# Patient Record
Sex: MALE | Race: WHITE | HISPANIC OR LATINO | Employment: FULL TIME | URBAN - METROPOLITAN AREA
[De-identification: names, ages, dates, MRNs, and addresses within clinical notes are randomized per-mention and may not be internally consistent; named-entity substitution may affect disease eponyms.]

---

## 2017-12-11 ENCOUNTER — CLINICAL SUPPORT (OUTPATIENT)
Dept: FAMILY MEDICINE | Facility: CLINIC | Age: 47
End: 2017-12-11

## 2017-12-11 DIAGNOSIS — Z00.00 ROUTINE GENERAL MEDICAL EXAMINATION AT A HEALTH CARE FACILITY: Primary | ICD-10-CM

## 2017-12-11 PROCEDURE — 80305 DRUG TEST PRSMV DIR OPT OBS: CPT | Mod: S$GLB,,, | Performed by: FAMILY MEDICINE

## 2017-12-11 NOTE — PROGRESS NOTES
Rosanna has presented today for self pay urine drug screen. He  has completed Non-DOT Drug Screen .    Total $35    Adela Patel

## 2018-01-03 ENCOUNTER — CLINICAL SUPPORT (OUTPATIENT)
Dept: FAMILY MEDICINE | Facility: CLINIC | Age: 48
End: 2018-01-03

## 2018-01-03 DIAGNOSIS — Z00.00 ROUTINE GENERAL MEDICAL EXAMINATION AT A HEALTH CARE FACILITY: ICD-10-CM

## 2018-01-03 PROCEDURE — 80305 DRUG TEST PRSMV DIR OPT OBS: CPT | Mod: S$GLB,,, | Performed by: FAMILY MEDICINE

## 2018-01-04 NOTE — PROGRESS NOTES
Pt here for a self pay urine drug screen.   Total charge is 35.00    We need to fax results to number provided and call pt to  a copy.

## 2020-06-04 ENCOUNTER — TELEPHONE (OUTPATIENT)
Dept: ORTHOPEDICS | Facility: CLINIC | Age: 50
End: 2020-06-04

## 2020-06-04 NOTE — TELEPHONE ENCOUNTER
Left message on voicemail for pt to call back. He was scheduled to see THI Spencer,NPC for biceps pain. Ms. Pop does not treat that condition. Pt will need to be rescheduled to Dr. Petersen or Dr. Bruner. Thanks, Brenda

## 2020-06-10 ENCOUNTER — OFFICE VISIT (OUTPATIENT)
Dept: ORTHOPEDICS | Facility: CLINIC | Age: 50
End: 2020-06-10
Payer: COMMERCIAL

## 2020-06-10 VITALS — TEMPERATURE: 100 F | WEIGHT: 147 LBS | RESPIRATION RATE: 16 BRPM | BODY MASS INDEX: 22.28 KG/M2 | HEIGHT: 68 IN

## 2020-06-10 DIAGNOSIS — M72.2 PLANTAR FASCIITIS, LEFT: ICD-10-CM

## 2020-06-10 DIAGNOSIS — M75.20 BICEPS TENDINITIS, UNSPECIFIED LATERALITY: Primary | ICD-10-CM

## 2020-06-10 PROCEDURE — 99204 PR OFFICE/OUTPT VISIT, NEW, LEVL IV, 45-59 MIN: ICD-10-PCS | Mod: S$GLB,,, | Performed by: ORTHOPAEDIC SURGERY

## 2020-06-10 PROCEDURE — 99204 OFFICE O/P NEW MOD 45 MIN: CPT | Mod: S$GLB,,, | Performed by: ORTHOPAEDIC SURGERY

## 2020-06-10 PROCEDURE — 99999 PR PBB SHADOW E&M-EST. PATIENT-LVL III: ICD-10-PCS | Mod: PBBFAC,,, | Performed by: ORTHOPAEDIC SURGERY

## 2020-06-10 PROCEDURE — 99999 PR PBB SHADOW E&M-EST. PATIENT-LVL III: CPT | Mod: PBBFAC,,, | Performed by: ORTHOPAEDIC SURGERY

## 2020-06-10 RX ORDER — MELOXICAM 15 MG/1
15 TABLET ORAL DAILY
Qty: 30 TABLET | Refills: 2 | Status: SHIPPED | OUTPATIENT
Start: 2020-06-10 | End: 2020-07-10
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
## 2020-09-02 ENCOUNTER — OFFICE VISIT (OUTPATIENT)
Dept: ORTHOPEDICS | Facility: CLINIC | Age: 50
End: 2020-09-02
Payer: COMMERCIAL

## 2020-09-02 VITALS — BODY MASS INDEX: 22.28 KG/M2 | HEIGHT: 68 IN | WEIGHT: 147 LBS | RESPIRATION RATE: 16 BRPM

## 2020-09-02 DIAGNOSIS — M35.3 POLYMYALGIA: Primary | ICD-10-CM

## 2020-09-02 DIAGNOSIS — M79.10 MUSCLE PAIN: Primary | ICD-10-CM

## 2020-09-02 PROCEDURE — 99999 PR PBB SHADOW E&M-EST. PATIENT-LVL III: ICD-10-PCS | Mod: PBBFAC,,, | Performed by: ORTHOPAEDIC SURGERY

## 2020-09-02 PROCEDURE — 99213 PR OFFICE/OUTPT VISIT, EST, LEVL III, 20-29 MIN: ICD-10-PCS | Mod: S$GLB,,, | Performed by: ORTHOPAEDIC SURGERY

## 2020-09-02 PROCEDURE — 99213 OFFICE O/P EST LOW 20 MIN: CPT | Mod: S$GLB,,, | Performed by: ORTHOPAEDIC SURGERY

## 2020-09-02 PROCEDURE — 99999 PR PBB SHADOW E&M-EST. PATIENT-LVL III: CPT | Mod: PBBFAC,,, | Performed by: ORTHOPAEDIC SURGERY
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
## 2020-09-03 ENCOUNTER — LAB VISIT (OUTPATIENT)
Dept: LAB | Facility: HOSPITAL | Age: 50
End: 2020-09-03
Attending: ORTHOPAEDIC SURGERY
Payer: COMMERCIAL

## 2020-09-03 DIAGNOSIS — M79.10 MUSCLE PAIN: ICD-10-CM

## 2020-09-03 LAB
CCP AB SER IA-ACNC: <0.5 U/ML
CK SERPL-CCNC: 188 U/L (ref 20–200)
CRP SERPL-MCNC: 0.3 MG/L (ref 0–8.2)
CRP SERPL-MCNC: 0.3 MG/L (ref 0–8.2)
ERYTHROCYTE [SEDIMENTATION RATE] IN BLOOD BY WESTERGREN METHOD: 1 MM/HR (ref 0–10)
ERYTHROCYTE [SEDIMENTATION RATE] IN BLOOD BY WESTERGREN METHOD: 1 MM/HR (ref 0–10)

## 2020-09-03 PROCEDURE — 36415 COLL VENOUS BLD VENIPUNCTURE: CPT | Mod: PO

## 2020-09-03 PROCEDURE — 85651 RBC SED RATE NONAUTOMATED: CPT | Mod: PO

## 2020-09-03 PROCEDURE — 86431 RHEUMATOID FACTOR QUANT: CPT

## 2020-09-03 PROCEDURE — 86038 ANTINUCLEAR ANTIBODIES: CPT

## 2020-09-03 PROCEDURE — 85025 COMPLETE CBC W/AUTO DIFF WBC: CPT

## 2020-09-03 PROCEDURE — 86140 C-REACTIVE PROTEIN: CPT

## 2020-09-03 PROCEDURE — 81374 HLA I TYPING 1 ANTIGEN LR: CPT | Mod: PO

## 2020-09-03 PROCEDURE — 86200 CCP ANTIBODY: CPT

## 2020-09-03 PROCEDURE — 82550 ASSAY OF CK (CPK): CPT

## 2020-09-04 LAB
BASOPHILS # BLD AUTO: 0.02 K/UL (ref 0–0.2)
BASOPHILS NFR BLD: 0.5 % (ref 0–1.9)
DIFFERENTIAL METHOD: NORMAL
EOSINOPHIL # BLD AUTO: 0.1 K/UL (ref 0–0.5)
EOSINOPHIL NFR BLD: 2.6 % (ref 0–8)
ERYTHROCYTE [DISTWIDTH] IN BLOOD BY AUTOMATED COUNT: 12.7 % (ref 11.5–14.5)
HCT VFR BLD AUTO: 45.1 % (ref 40–54)
HGB BLD-MCNC: 14.7 G/DL (ref 14–18)
IMM GRANULOCYTES # BLD AUTO: 0.01 K/UL (ref 0–0.04)
IMM GRANULOCYTES NFR BLD AUTO: 0.2 % (ref 0–0.5)
LYMPHOCYTES # BLD AUTO: 1.2 K/UL (ref 1–4.8)
LYMPHOCYTES NFR BLD: 27.2 % (ref 18–48)
MCH RBC QN AUTO: 29.5 PG (ref 27–31)
MCHC RBC AUTO-ENTMCNC: 32.6 G/DL (ref 32–36)
MCV RBC AUTO: 90 FL (ref 82–98)
MONOCYTES # BLD AUTO: 0.3 K/UL (ref 0.3–1)
MONOCYTES NFR BLD: 6.8 % (ref 4–15)
NEUTROPHILS # BLD AUTO: 2.7 K/UL (ref 1.8–7.7)
NEUTROPHILS NFR BLD: 62.7 % (ref 38–73)
NRBC BLD-RTO: 0 /100 WBC
PLATELET # BLD AUTO: 199 K/UL (ref 150–350)
PMV BLD AUTO: 9.9 FL (ref 9.2–12.9)
RBC # BLD AUTO: 4.99 M/UL (ref 4.6–6.2)
WBC # BLD AUTO: 4.27 K/UL (ref 3.9–12.7)

## 2020-09-05 LAB — RHEUMATOID FACT SERPL-ACNC: 11 IU/ML (ref 0–15)

## 2020-09-08 LAB — ANA SER QL IF: NORMAL

## 2020-09-14 LAB
HLA B27 INTERPRETATION: NORMAL
HLA-B27 RELATED AG QL: NEGATIVE
HLA-B27 RELATED AG QL: NORMAL

## 2020-09-21 ENCOUNTER — TELEPHONE (OUTPATIENT)
Dept: ORTHOPEDICS | Facility: CLINIC | Age: 50
End: 2020-09-21

## 2020-09-21 DIAGNOSIS — M25.50 POLYARTHRALGIA: Primary | ICD-10-CM

## 2020-09-21 NOTE — TELEPHONE ENCOUNTER
----- Message from Tiff Sousa MA sent at 9/21/2020 12:51 PM CDT -----  Contact: pt  Lab results   Call back

## 2020-09-21 NOTE — TELEPHONE ENCOUNTER
Called and spoke with patient and advised that all of his labs were normal per Dr. Esteban. Appointment made with Dr. De Paz per Dr. Esteban.

## 2020-09-23 ENCOUNTER — OFFICE VISIT (OUTPATIENT)
Dept: PHYSICAL MEDICINE AND REHAB | Facility: CLINIC | Age: 50
End: 2020-09-23
Payer: COMMERCIAL

## 2020-09-23 VITALS — WEIGHT: 134.56 LBS | HEIGHT: 68 IN | BODY MASS INDEX: 20.39 KG/M2

## 2020-09-23 DIAGNOSIS — M79.602 PAIN IN BOTH UPPER EXTREMITIES: ICD-10-CM

## 2020-09-23 DIAGNOSIS — M79.601 PAIN IN BOTH UPPER EXTREMITIES: ICD-10-CM

## 2020-09-23 PROCEDURE — 99243 PR OFFICE CONSULTATION,LEVEL III: ICD-10-PCS | Mod: S$GLB,,, | Performed by: PHYSICAL MEDICINE & REHABILITATION

## 2020-09-23 PROCEDURE — 99243 OFF/OP CNSLTJ NEW/EST LOW 30: CPT | Mod: S$GLB,,, | Performed by: PHYSICAL MEDICINE & REHABILITATION

## 2020-09-23 PROCEDURE — 99999 PR PBB SHADOW E&M-EST. PATIENT-LVL III: ICD-10-PCS | Mod: PBBFAC,,, | Performed by: PHYSICAL MEDICINE & REHABILITATION

## 2020-09-23 PROCEDURE — 99999 PR PBB SHADOW E&M-EST. PATIENT-LVL III: CPT | Mod: PBBFAC,,, | Performed by: PHYSICAL MEDICINE & REHABILITATION

## 2020-09-23 RX ORDER — METHYLPREDNISOLONE 4 MG/1
TABLET ORAL
Qty: 1 PACKAGE | Refills: 0 | Status: SHIPPED | OUTPATIENT
Start: 2020-09-23 | End: 2020-10-14

## 2020-09-23 RX ORDER — MELOXICAM 15 MG/1
TABLET ORAL
COMMUNITY
Start: 2020-08-19
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
## 2020-09-23 NOTE — LETTER
September 23, 2020      Bonifacio Esteban MD  [address] [place] [address] [address]  [place] [address]           [facility]  [address] [facility] [address]  Phone: [phone]  Fax: [phone]          Patient: Rosanna Fish   MR Number: [id number]   YOB: 1970   Date of Visit: 9/23/2020       Dear Dr. Bonifacio Esteban:    Thank you for referring Rosanna Fish to me for evaluation. Attached you will find relevant portions of my assessment and plan of care.    If you have questions, please do not hesitate to call me. I look forward to following Rosanna Fish along with you.    Sincerely,    Anand De Paz MD    Enclosure  CC:  No Recipients    If you would like to receive this communication electronically, please contact [email or web address] or [phone] to request more information on [employer] access.    For providers and/or their staff who would like to refer a patient to [facility], please contact us through our one-stop-shop provider referral line, [facility], at [phone].    If you feel you have received this communication in error or would no longer like to receive these types of communications, please e-mail [email or web address]

## 2020-09-30 ENCOUNTER — CLINICAL SUPPORT (OUTPATIENT)
Dept: REHABILITATION | Facility: HOSPITAL | Age: 50
End: 2020-09-30
Attending: PHYSICAL MEDICINE & REHABILITATION
Payer: COMMERCIAL

## 2020-09-30 DIAGNOSIS — M79.602 PAIN IN BOTH UPPER EXTREMITIES: ICD-10-CM

## 2020-09-30 DIAGNOSIS — M79.601 PAIN IN BOTH UPPER EXTREMITIES: ICD-10-CM

## 2020-09-30 PROCEDURE — 97161 PT EVAL LOW COMPLEX 20 MIN: CPT | Mod: PO

## 2020-09-30 PROCEDURE — 97140 MANUAL THERAPY 1/> REGIONS: CPT | Mod: PO

## 2020-09-30 PROCEDURE — 97110 THERAPEUTIC EXERCISES: CPT | Mod: PO
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
## 2021-01-08 ENCOUNTER — CLINICAL SUPPORT (OUTPATIENT)
Dept: REHABILITATION | Facility: HOSPITAL | Age: 51
End: 2021-01-08
Attending: PHYSICAL MEDICINE & REHABILITATION
Payer: COMMERCIAL

## 2021-01-08 DIAGNOSIS — M79.602 PAIN IN BOTH UPPER EXTREMITIES: Primary | ICD-10-CM

## 2021-01-08 DIAGNOSIS — M79.601 PAIN IN BOTH UPPER EXTREMITIES: Primary | ICD-10-CM

## 2021-01-08 PROCEDURE — 97140 MANUAL THERAPY 1/> REGIONS: CPT | Mod: PO

## 2021-01-11 ENCOUNTER — CLINICAL SUPPORT (OUTPATIENT)
Dept: REHABILITATION | Facility: HOSPITAL | Age: 51
End: 2021-01-11
Payer: COMMERCIAL

## 2021-01-11 DIAGNOSIS — M79.602 PAIN IN BOTH UPPER EXTREMITIES: Primary | ICD-10-CM

## 2021-01-11 DIAGNOSIS — M79.601 PAIN IN BOTH UPPER EXTREMITIES: Primary | ICD-10-CM

## 2021-01-11 PROCEDURE — 97140 MANUAL THERAPY 1/> REGIONS: CPT | Mod: PN

## 2023-12-21 ENCOUNTER — OFFICE VISIT (OUTPATIENT)
Dept: UROLOGY | Facility: CLINIC | Age: 53
End: 2023-12-21
Payer: COMMERCIAL

## 2023-12-21 VITALS — HEIGHT: 68 IN | WEIGHT: 146.63 LBS | BODY MASS INDEX: 22.22 KG/M2

## 2023-12-21 DIAGNOSIS — R30.9 PAINFUL URINATION: Primary | ICD-10-CM

## 2023-12-21 DIAGNOSIS — N41.0 ACUTE PROSTATITIS: ICD-10-CM

## 2023-12-21 LAB
BACTERIA #/AREA URNS HPF: NORMAL /HPF
BILIRUBIN, UA POC OHS: NEGATIVE
BLOOD, UA POC OHS: NEGATIVE
CLARITY, UA POC OHS: CLEAR
COLOR, UA POC OHS: YELLOW
GLUCOSE, UA POC OHS: NEGATIVE
KETONES, UA POC OHS: NEGATIVE
LEUKOCYTES, UA POC OHS: NEGATIVE
MICROSCOPIC COMMENT: NORMAL
NITRITE, UA POC OHS: NEGATIVE
PH, UA POC OHS: 7.5
PROTEIN, UA POC OHS: NEGATIVE
RBC #/AREA URNS HPF: 1 /HPF (ref 0–4)
SPECIFIC GRAVITY, UA POC OHS: 1.01
SQUAMOUS #/AREA URNS HPF: 1 /HPF
UROBILINOGEN, UA POC OHS: 0.2
WBC #/AREA URNS HPF: 1 /HPF (ref 0–5)

## 2023-12-21 PROCEDURE — 81000 URINALYSIS NONAUTO W/SCOPE: CPT | Mod: PO

## 2023-12-21 PROCEDURE — 99204 PR OFFICE/OUTPT VISIT, NEW, LEVL IV, 45-59 MIN: ICD-10-PCS | Mod: 25,S$GLB,,

## 2023-12-21 PROCEDURE — 81003 URINALYSIS AUTO W/O SCOPE: CPT | Mod: QW,S$GLB,,

## 2023-12-21 PROCEDURE — 99999 PR PBB SHADOW E&M-EST. PATIENT-LVL III: ICD-10-PCS | Mod: PBBFAC,,,

## 2023-12-21 PROCEDURE — 87086 URINE CULTURE/COLONY COUNT: CPT

## 2023-12-21 PROCEDURE — 99204 OFFICE O/P NEW MOD 45 MIN: CPT | Mod: 25,S$GLB,,

## 2023-12-21 PROCEDURE — 81003 POCT URINALYSIS(INSTRUMENT): ICD-10-PCS | Mod: QW,S$GLB,,

## 2023-12-21 PROCEDURE — 99999 PR PBB SHADOW E&M-EST. PATIENT-LVL III: CPT | Mod: PBBFAC,,,

## 2023-12-21 RX ORDER — LEVOFLOXACIN 500 MG/1
500 TABLET, FILM COATED ORAL DAILY
Qty: 21 TABLET | Refills: 0 | Status: SHIPPED | OUTPATIENT
Start: 2023-12-21 | End: 2024-01-11

## 2023-12-21 RX ORDER — ALFUZOSIN HYDROCHLORIDE 10 MG/1
10 TABLET, EXTENDED RELEASE ORAL
Qty: 21 TABLET | Refills: 0 | Status: SHIPPED | OUTPATIENT
Start: 2023-12-21 | End: 2024-02-14 | Stop reason: SDUPTHER
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
## 2023-12-23 LAB — BACTERIA UR CULT: NO GROWTH

## 2023-12-29 ENCOUNTER — PATIENT MESSAGE (OUTPATIENT)
Dept: UROLOGY | Facility: CLINIC | Age: 53
End: 2023-12-29
Payer: COMMERCIAL

## 2024-01-10 ENCOUNTER — PATIENT MESSAGE (OUTPATIENT)
Dept: UROLOGY | Facility: CLINIC | Age: 54
End: 2024-01-10
Payer: COMMERCIAL

## 2024-01-10 NOTE — TELEPHONE ENCOUNTER
The urine culture showed no growth. I recommend taking the scheduled Mobic. If symptoms worsen then we can repeat a urinalysis and culture.

## 2024-01-12 ENCOUNTER — TELEPHONE (OUTPATIENT)
Dept: UROLOGY | Facility: CLINIC | Age: 54
End: 2024-01-12
Payer: COMMERCIAL

## 2024-01-12 NOTE — TELEPHONE ENCOUNTER
----- Message from Lin Morales NP sent at 1/12/2024  4:11 PM CST -----  Contact: self  Per Dr. Maria- he needs to continue mobic as prescribed and alfuzosin- no abx needed at this time  ----- Message -----  From: Michelle Berry RN  Sent: 1/11/2024   3:06 PM CST  To: Lin Morales NP    He states that he had a bad reaction to the abx sent in but is still having the symptoms for prostate infection- he is requesting alternative  ----- Message -----  From: Claudia Azul  Sent: 1/11/2024  11:32 AM CST  To: Danita CERVANTES Staff    Type: Needs Medical Advice  Who Called: Patient   Best Call Back Number: [phone]  Pharmacy . :   [id number] [facility] [id number] [place] [address] AT [place] [address]  [id number] [place] [address]  Phone: [phone] Fax: [phone]  Additional Information: Pt needs a different  antibiotic for his prostate infection the other one has  side effects. Thanks

## 2024-02-14 ENCOUNTER — TELEPHONE (OUTPATIENT)
Dept: UROLOGY | Facility: CLINIC | Age: 54
End: 2024-02-14
Payer: COMMERCIAL

## 2024-02-14 DIAGNOSIS — R30.9 PAINFUL URINATION: ICD-10-CM

## 2024-02-14 DIAGNOSIS — N41.0 ACUTE PROSTATITIS: ICD-10-CM

## 2024-02-14 RX ORDER — DOXYCYCLINE 100 MG/1
100 CAPSULE ORAL 2 TIMES DAILY
Qty: 42 CAPSULE | Refills: 0 | Status: SHIPPED | OUTPATIENT
Start: 2024-02-14 | End: 2024-03-06

## 2024-02-14 RX ORDER — ALFUZOSIN HYDROCHLORIDE 10 MG/1
10 TABLET, EXTENDED RELEASE ORAL
Qty: 21 TABLET | Refills: 0 | Status: SHIPPED | OUTPATIENT
Start: 2024-02-14 | End: 2024-04-01

## 2024-02-14 NOTE — TELEPHONE ENCOUNTER
"----- Message from Michelle Berry RN sent at 2/14/2024 12:46 PM CST -----  Regarding: FW: still having pain  Patient never finished mobic prescribed because he states "it was causing joint pain" he says he is still having the prostate pain and requesting a different abx  ----- Message -----  From: Jovanny Be  Sent: 2/14/2024  12:20 PM CST  To: Danita CERVANTES Staff  Subject: still having pain                                Type:  Needs Medical Advice    Who Called: Pt    Symptoms (please be specific): still having pain     How long has patient had these symptoms:      Pharmacy name and phone #:        [facility] #[id number] - [place], [place] - [address] [place] [address] AT [place] [address] & [place] [address]  [id number] [place] [address]  Phone: [phone] Fax: [phone]        Would the patient rather a call back or a response via [facility]? Call back    Best Call Back Number: [phone]      Additional Information: Sts he wants to know about getting more antibiotics because he is still having pain.   Please advise -- Thank you         "

## 2024-02-14 NOTE — TELEPHONE ENCOUNTER
----- Message from Jovanny Be sent at 2/14/2024 12:19 PM CST -----  Regarding: still having pain  Type:  Needs Medical Advice    Who Called: Pt    Symptoms (please be specific): still having pain     How long has patient had these symptoms:      Pharmacy name and phone #:        [facility] #[id number] - [facility] [address] [address] AT [facility] [address] [place] [address] [address]  [address] [place] [address]  Phone: [phone] Fax: [phone]        Would the patient rather a call back or a response via [facility]? Call back    Best Call Back Number: [phone]      Additional Information: Sts he wants to know about getting more antibiotics because he is still having pain.   Please advise -- Thank you

## 2024-02-14 NOTE — TELEPHONE ENCOUNTER
"----- Message from Lin Morales NP sent at 2/14/2024  4:05 PM CST -----  Regarding: RE: still having pain  Doxycycline and alfuzosin sent in but if symptoms return he will need appt  with one of the physicians as it would not be an infection  ----- Message -----  From: Michelle Berry RN  Sent: 2/14/2024  12:48 PM CST  To: Lin Morales NP  Subject: FW: still having pain                            Patient never finished mobic prescribed because he states "it was causing joint pain" he says he is still having the prostate pain and requesting a different abx  ----- Message -----  From: Jovanny Be  Sent: 2/14/2024  12:20 PM CST  To: Danita CERVANTES Staff  Subject: still having pain                                Type:  Needs Medical Advice    Who Called: Pt    Symptoms (please be specific): still having pain     How long has patient had these symptoms:      Pharmacy name and phone #:        [facility] #[id number] - [facility] [id number] [place] [address] [address] [place] [address] & [place] [address]  [address] [place] [address]  Phone: [phone] Fax: [phone]        Would the patient rather a call back or a response via [facility]? Call back    Best Call Back Number: [phone]      Additional Information: Sts he wants to know about getting more antibiotics because he is still having pain.   Please advise -- Thank you           "

## 2024-04-01 ENCOUNTER — OFFICE VISIT (OUTPATIENT)
Dept: UROLOGY | Facility: CLINIC | Age: 54
End: 2024-04-01
Payer: COMMERCIAL

## 2024-04-01 VITALS — WEIGHT: 148.38 LBS | BODY MASS INDEX: 22.49 KG/M2 | HEIGHT: 68 IN

## 2024-04-01 DIAGNOSIS — R30.0 DYSURIA: Primary | ICD-10-CM

## 2024-04-01 DIAGNOSIS — R31.0 GROSS HEMATURIA: ICD-10-CM

## 2024-04-01 LAB
BILIRUBIN, UA POC OHS: NEGATIVE
BLOOD, UA POC OHS: NEGATIVE
CLARITY, UA POC OHS: CLEAR
COLOR, UA POC OHS: YELLOW
GLUCOSE, UA POC OHS: NEGATIVE
KETONES, UA POC OHS: NEGATIVE
LEUKOCYTES, UA POC OHS: NEGATIVE
NITRITE, UA POC OHS: NEGATIVE
PH, UA POC OHS: 6.5
PROTEIN, UA POC OHS: NEGATIVE
SPECIFIC GRAVITY, UA POC OHS: 1.01
UROBILINOGEN, UA POC OHS: 0.2

## 2024-04-01 PROCEDURE — 1159F MED LIST DOCD IN RCRD: CPT | Mod: CPTII,S$GLB,,

## 2024-04-01 PROCEDURE — 81003 URINALYSIS AUTO W/O SCOPE: CPT | Mod: QW,S$GLB,,

## 2024-04-01 PROCEDURE — 99999 PR PBB SHADOW E&M-EST. PATIENT-LVL III: CPT | Mod: PBBFAC,,,

## 2024-04-01 PROCEDURE — 99214 OFFICE O/P EST MOD 30 MIN: CPT | Mod: S$GLB,,,

## 2024-04-01 PROCEDURE — 3008F BODY MASS INDEX DOCD: CPT | Mod: CPTII,S$GLB,,

## 2024-04-01 PROCEDURE — 1160F RVW MEDS BY RX/DR IN RCRD: CPT | Mod: CPTII,S$GLB,,

## 2024-04-01 RX ORDER — PHENAZOPYRIDINE HYDROCHLORIDE 200 MG/1
200 TABLET, FILM COATED ORAL 3 TIMES DAILY PRN
Qty: 30 TABLET | Refills: 0 | Status: SHIPPED | OUTPATIENT
Start: 2024-04-01 | End: 2024-04-11

## 2024-04-01 RX ORDER — DOXYCYCLINE 100 MG/1
100 CAPSULE ORAL 2 TIMES DAILY
COMMUNITY
Start: 2024-03-29
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
## 2024-04-06 ENCOUNTER — HOSPITAL ENCOUNTER (OUTPATIENT)
Dept: RADIOLOGY | Facility: HOSPITAL | Age: 54
Discharge: HOME | End: 2024-04-06
Payer: COMMERCIAL

## 2024-04-06 DIAGNOSIS — R31.0 GROSS HEMATURIA: ICD-10-CM

## 2024-04-06 DIAGNOSIS — R30.0 DYSURIA: ICD-10-CM

## 2024-04-06 PROCEDURE — 74176 CT ABD & PELVIS W/O CONTRAST: CPT | Mod: TC,PO

## 2024-04-06 PROCEDURE — 74176 CT ABD & PELVIS W/O CONTRAST: CPT | Mod: 26,,, | Performed by: RADIOLOGY

## 2024-04-12 ENCOUNTER — TELEPHONE (OUTPATIENT)
Dept: UROLOGY | Facility: CLINIC | Age: 54
End: 2024-04-12
Payer: COMMERCIAL

## 2024-04-12 DIAGNOSIS — N40.1 BENIGN PROSTATIC HYPERPLASIA WITH WEAK URINARY STREAM: Primary | ICD-10-CM

## 2024-04-12 DIAGNOSIS — R39.12 BENIGN PROSTATIC HYPERPLASIA WITH WEAK URINARY STREAM: Primary | ICD-10-CM

## 2024-04-12 RX ORDER — MELOXICAM 15 MG/1
15 TABLET ORAL DAILY
Qty: 14 TABLET | Refills: 0 | Status: SHIPPED | OUTPATIENT
Start: 2024-04-12 | End: 2024-04-26

## 2024-04-12 RX ORDER — ALFUZOSIN HYDROCHLORIDE 10 MG/1
10 TABLET, EXTENDED RELEASE ORAL
Qty: 30 TABLET | Refills: 0 | Status: SHIPPED | OUTPATIENT
Start: 2024-04-12 | End: 2024-05-12

## 2024-04-12 NOTE — TELEPHONE ENCOUNTER
----- Message from Lin Morales NP sent at 4/12/2024  2:33 PM CDT -----  Regarding: RE: Pt Advice  He was given doxycycline by urgent care prior to seeing me. Does he know if his urine from urgent care showed an infection?  Other symptoms? Fever, dysuria, hematuria, urgency, frequency?  ----- Message -----  From: Ingrid Wells MA  Sent: 4/12/2024   1:56 PM CDT  To: Lin Morales NP  Subject: FW: Pt Advice                                    Please advise with recommendations    ----- Message -----  From: Hang Ahmadi  Sent: 4/12/2024   1:50 PM CDT  To: Danita CERVANTES Staff  Subject: Pt Advice                                        Needs Medical Advice      Who Called: Pt         Pharmacy name and phone #:    [facility] #[id number] [place] [address] AT [place] [address] & [address] [address]  Phone: [phone] Fax: [phone]          Would the patient rather a call back or a response via [facility]? Call back    Best Call Back Number:  [phone]      Additional Information: Sts he was prescribed doxycycline (MONODOX) 100 MG capsule and it is currently not working and would like to see if he can be prescribed something else. Sts he is having severe prostate pain.   Please Advise - Thank you

## 2024-04-12 NOTE — TELEPHONE ENCOUNTER
----- Message from Lin Morales NP sent at 4/12/2024  3:20 PM CDT -----  Regarding: RE: Pt Advice  If your did not show infection, then he doesn't need different abx.    Imaging did not show any abnormalities. He can try antiinflammatories, sitz bath and I will send in a short course of enlarged prostate medication to help  ----- Message -----  From: Ingrid Wells MA  Sent: 4/12/2024   3:04 PM CDT  To: Lin Morales NP  Subject: RE: Pt Advice                                    Pt stated he is not having any other symptoms at this time he had it before going to urgent care. Pt stated that urgent care would call him if urine showed infection and pt hasn't received call. Pt states it just still feels like he is sitting on a golf ball  ----- Message -----  From: Lin Morales NP  Sent: 4/12/2024   2:34 PM CDT  To: Ingrid Wells MA  Subject: RE: Pt Advice                                    He was given doxycycline by urgent care prior to seeing me. Does he know if his urine from urgent care showed an infection?  Other symptoms? Fever, dysuria, hematuria, urgency, frequency?  ----- Message -----  From: Ingrid Wells MA  Sent: 4/12/2024   1:56 PM CDT  To: Lin Morales NP  Subject: FW: Pt Advice                                    Please advise with recommendations    ----- Message -----  From: Hang Ahmadi  Sent: 4/12/2024   1:50 PM CDT  To: Danita CERVANTES Staff  Subject: Pt Advice                                        Needs Medical Advice      Who Called: Pt         Pharmacy name and phone #:    [facility] #[id number] - [facility] [id number] [place] [address] AT [facility] [address] [place] [address]  [address] [place] [phone]  Phone: [phone] Fax: [phone]          Would the patient rather a call back or a response via [facility]? Call back    Best Call Back Number:  [phone]      Additional Information: Sts he was prescribed doxycycline (MONODOX) 100 MG capsule and it is currently not  working and would like to see if he can be prescribed something else. Sts he is having severe prostate pain.   Please Advise - Thank you

## 2025-04-17 ENCOUNTER — OFFICE VISIT (OUTPATIENT)
Dept: URGENT CARE | Facility: CLINIC | Age: 55
End: 2025-04-17
Payer: COMMERCIAL

## 2025-04-17 VITALS
OXYGEN SATURATION: 97 % | RESPIRATION RATE: 17 BRPM | WEIGHT: 148 LBS | BODY MASS INDEX: 22.43 KG/M2 | TEMPERATURE: 99 F | HEIGHT: 68 IN | DIASTOLIC BLOOD PRESSURE: 78 MMHG | SYSTOLIC BLOOD PRESSURE: 134 MMHG | HEART RATE: 78 BPM

## 2025-04-17 DIAGNOSIS — N48.1 BALANITIS: Primary | ICD-10-CM

## 2025-04-17 PROCEDURE — 99203 OFFICE O/P NEW LOW 30 MIN: CPT | Mod: S$GLB,,, | Performed by: FAMILY MEDICINE

## 2025-04-17 RX ORDER — CLOTRIMAZOLE AND BETAMETHASONE DIPROPIONATE 10; .64 MG/G; MG/G
CREAM TOPICAL 2 TIMES DAILY
Qty: 15 G | Refills: 0 | Status: SHIPPED | OUTPATIENT
Start: 2025-04-17 | End: 2025-04-25

## 2025-04-17 NOTE — PROGRESS NOTES
"Subjective:      Patient ID: Rosanna Fish is a 54 y.o. male.    Vitals:  height is 5' 8" (1.727 m) and weight is 67.1 kg (148 lb). His oral temperature is 98.7 °F (37.1 °C). His blood pressure is 134/78 and his pulse is 78. His respiration is 17 and oxygen saturation is 97%.     Chief Complaint: Groin Swelling                              This is a 54 y.o. male who presents today with a chief complaint of penile swelling,pain, sx started a week ago or less, patient states there was a discomfort.    ROS   Objective:     Physical Exam   Constitutional: He is oriented to person, place, and time. He appears well-developed. No distress.   HENT:   Head: Normocephalic and atraumatic.   Ears:   Right Ear: External ear normal.   Left Ear: External ear normal.   Nose: Nose normal. No nasal deformity. No epistaxis.   Mouth/Throat: Oropharynx is clear and moist and mucous membranes are normal.   Eyes: Conjunctivae and lids are normal.   Neck: Trachea normal and phonation normal. Neck supple.   Cardiovascular: Normal rate, regular rhythm and normal heart sounds.   Pulmonary/Chest: Effort normal and breath sounds normal.   Abdominal: Normal appearance and bowel sounds are normal. He exhibits no distension. Soft. There is no abdominal tenderness.   Genitourinary: Paraphimosis and penile swelling present.   Musculoskeletal: Normal range of motion.         General: Normal range of motion.   Neurological: He is alert and oriented to person, place, and time. He has normal reflexes.   Skin: Skin is warm, dry, intact and not diaphoretic.   Psychiatric: His speech is normal and behavior is normal. Judgment and thought content normal.   Nursing note and vitals reviewed.      Assessment:     1. Balanitis        Plan:       Balanitis  -     clotrimazole-betamethasone 1-0.05% (LOTRISONE) cream; Apply topically 2 (two) times daily.  Dispense: 15 g; Refill: 0      Thank you for choosing [facility]!     Our goal in the Urgent " Care is to always provide outstanding medical care. You may receive a survey by mail or e-mail in the next week regarding your experience today. We would greatly appreciate you completing and returning the survey. Your feedback provides us with a way to recognize our staff who provide very good care, and it helps us learn how to improve when your experience was below our aspiration of excellence.       We appreciate you trusting us with your medical care. We hope you feel better soon. We will be happy to take care of you for all of your future medical needs.  You must understand that you've received an Urgent Care treatment only and that you may be released before all your medical problems are known or treated. You, the patient, will arrange for follow up care as instructed.  Follow up with your PCP or specialty clinic as directed in the next 1-2 weeks if not improved or as needed.  You can call [phone] to schedule an appointment with the appropriate provider.  Another option is to follow up with [facility] ([email or web address]) virtually for quick simple medical advice.  If your condition worsens we recommend that you receive another evaluation at the emergency room immediately or contact your primary medical clinics after hours call service to discuss your concerns.  Please return here or go to the Emergency Department for any concerns or worsening of condition.      *If you were prescribed a narcotic or controlled medication, do not drive or operate heavy equipment or machinery while taking these medications.

## 2025-04-25 ENCOUNTER — OFFICE VISIT (OUTPATIENT)
Dept: URGENT CARE | Facility: CLINIC | Age: 55
End: 2025-04-25
Payer: COMMERCIAL

## 2025-04-25 VITALS
WEIGHT: 147.69 LBS | SYSTOLIC BLOOD PRESSURE: 138 MMHG | HEIGHT: 68 IN | TEMPERATURE: 98 F | BODY MASS INDEX: 22.38 KG/M2 | HEART RATE: 78 BPM | RESPIRATION RATE: 20 BRPM | OXYGEN SATURATION: 95 % | DIASTOLIC BLOOD PRESSURE: 72 MMHG

## 2025-04-25 DIAGNOSIS — R39.89 GENITAL SORE: ICD-10-CM

## 2025-04-25 DIAGNOSIS — N48.89 PENILE PAIN: Primary | ICD-10-CM

## 2025-04-25 DIAGNOSIS — N48.1 BALANITIS: ICD-10-CM

## 2025-04-25 LAB
BILIRUBIN, UA POC OHS: NEGATIVE
BLOOD, UA POC OHS: NEGATIVE
CLARITY, UA POC OHS: CLEAR
COLOR, UA POC OHS: YELLOW
GLUCOSE, UA POC OHS: NEGATIVE
KETONES, UA POC OHS: NEGATIVE
LEUKOCYTES, UA POC OHS: NEGATIVE
NITRITE, UA POC OHS: NEGATIVE
PH, UA POC OHS: 6.5
PROTEIN, UA POC OHS: NEGATIVE
SPECIFIC GRAVITY, UA POC OHS: <=1.005
UROBILINOGEN, UA POC OHS: 0.2

## 2025-04-25 PROCEDURE — 99213 OFFICE O/P EST LOW 20 MIN: CPT | Mod: S$GLB,,, | Performed by: PHYSICIAN ASSISTANT

## 2025-04-25 PROCEDURE — 81003 URINALYSIS AUTO W/O SCOPE: CPT | Mod: QW,S$GLB,, | Performed by: PHYSICIAN ASSISTANT

## 2025-04-25 RX ORDER — VALACYCLOVIR HYDROCHLORIDE 1 G/1
1000 TABLET, FILM COATED ORAL EVERY 12 HOURS
Qty: 20 TABLET | Refills: 0 | Status: SHIPPED | OUTPATIENT
Start: 2025-04-25 | End: 2025-05-05

## 2025-04-25 RX ORDER — LIDOCAINE AND PRILOCAINE 25; 25 MG/G; MG/G
CREAM TOPICAL
Qty: 30 G | Refills: 0 | Status: SHIPPED | OUTPATIENT
Start: 2025-04-25

## 2025-04-25 NOTE — LETTER
"  April 25, 2025      [facility] [place]  [place] [address]  Phone: [phone]  Fax: [phone]       Patient: Rosanna Fish   YOB: 1970  Date of Visit: 04/25/2025    To Whom It May Concern:    Reza Fish  was at [facility] on 04/25/2025. The patient may return to work/school on 4/25/25 with no restrictions. If you have any questions or concerns, or if I can be of further assistance, please do not hesitate to contact me.    Sincerely,        Amandashawn Cabrera PA-C (Jackie)       "

## 2025-04-25 NOTE — PROGRESS NOTES
"Subjective:      Patient ID: Rosanan Fish is a 54 y.o. male.    Vitals:  height is 5' 8" (1.727 m) and weight is 67 kg (147 lb 11.3 oz). His oral temperature is 98 °F (36.7 °C). His blood pressure is 138/72 and his pulse is 78. His respiration is 20 and oxygen saturation is 95%.     Chief Complaint: Penis Pain    Rosanna Fish is a 54 y.o. male who complains of  penis pain, possible balanitis, swelling. Pt states he is having black spot penis that causing pain; has new lesion that is 3 days old.. Sx started 2 weeks ago. Pt was seen 04/17/2025; reports swelling has improved with lotrisone cream.   Patient states he is ; no concerns for STDS.     Penis Pain  The patient's primary symptoms include genital lesions and penile pain. The patient's pertinent negatives include no genital injury, genital itching, pelvic pain, penile discharge, scrotal swelling or testicular pain. This is a recurrent problem. The current episode started 1 to 4 weeks ago. The problem occurs constantly. The problem has been unchanged. The pain is severe. Associated symptoms include a rash. Pertinent negatives include no abdominal pain, anorexia, chest pain, chills, constipation, coughing, diarrhea, discolored urine, dysuria, fever, flank pain, frequency, headaches, hematuria, hesitancy, joint pain, joint swelling, nausea, painful intercourse, shortness of breath, sore throat, urgency, urinary retention or vomiting. Nothing aggravates the symptoms. Treatments tried: lotrisone. The treatment provided mild relief. He is sexually active. He never uses condoms. No, his partner does not have an STD. There is no history of chlamydia, gonorrhea, herpes simplex, HIV, prostatitis or syphilis.       Constitution: Negative for chills and fever.   HENT:  Negative for sore throat.    Cardiovascular:  Negative for chest pain.   Respiratory:  Negative for cough and shortness of breath.    Gastrointestinal:  Negative for abdominal pain, nausea, " vomiting, constipation and diarrhea.   Genitourinary:  Positive for penile pain. Negative for dysuria, frequency, urgency, flank pain, penile discharge, scrotal swelling, testicular pain and pelvic pain.   Skin:  Positive for rash.   Neurological:  Negative for headaches.      Objective:     Physical Exam   Constitutional: He is oriented to person, place, and time. No distress.      Comments:Patient is awake and alert, sitting up in exam chair, speaking and answering in complete sentences     normal  HENT:   Head: Normocephalic and atraumatic.   Ears:   Right Ear: External ear normal.   Left Ear: External ear normal.   Nose: Nose normal.   Eyes: Conjunctivae are normal. Extraocular movement intact   Neck: Neck supple.   Pulmonary/Chest: Effort normal.   Abdominal: Normal appearance.   Genitourinary:               Comments: Black plaque to dorsal aspect of penis, erythematous vesicle with underlying hyperpigmentation noted to left penile shaft     Musculoskeletal: Normal range of motion.         General: Normal range of motion.   Neurological: He is alert and oriented to person, place, and time.   Skin: Skin is warm. lesion   Psychiatric: His behavior is normal. Mood, judgment and thought content normal.   Nursing note and vitals reviewed.chaperone present         Assessment:     1. Penile pain    2. Genital sore    3. Balanitis      Patient presents with clinical exam findings and history consistent with above.      On exam, patient is nontoxic appearing and vitals are stable.       Diagnostic testing results were reviewed and discussed with patient/guardian.   Tests ordered in clinic:  Results for orders placed or performed in visit on 04/25/25   POCT Urinalysis(Instrument)    Collection Time: 04/25/25 11:58 AM   Result Value Ref Range    Color, POC UA Yellow Yellow, Straw, Colorless    Clarity, POC UA Clear Clear    Glucose, POC UA Negative Negative    Bilirubin, POC UA Negative Negative    Ketones, POC UA  Negative Negative    Spec Grav POC UA <=1.005 1.005 - 1.030    Blood, POC UA Negative Negative    pH, POC UA 6.5 5.0 - 8.0    Protein, POC UA Negative Negative    Urobilinogen, POC UA 0.2 <=1.0    Nitrite, POC UA Negative Negative    WBC, POC UA Negative Negative       Previous progress notes/admissions/labs and medications were reviewed.      Plan:   Discussed ddx of HSV-2 due to painful lesions; states his wife does not have cold sores.      Penile pain  -     POCT Urinalysis(Instrument)  -     HSV by Rapid PCR [facility]; Tissue; Other (Specify) (penile shaft)  -     valACYclovir (VALTREX) 1000 MG tablet; Take 1 tablet (1,000 mg total) by mouth every 12 (twelve) hours. for 10 days  Dispense: 20 tablet; Refill: 0  -     LIDOcaine-prilocaine (EMLA) cream; Apply thin layer topically to affected areas BID prn pain.  Dispense: 30 g; Refill: 0  -     Ambulatory referral/consult to Urology    Genital sore  -     HSV by Rapid PCR [facility]; Tissue; Other (Specify) (penile shaft)  -     valACYclovir (VALTREX) 1000 MG tablet; Take 1 tablet (1,000 mg total) by mouth every 12 (twelve) hours. for 10 days  Dispense: 20 tablet; Refill: 0  -     LIDOcaine-prilocaine (EMLA) cream; Apply thin layer topically to affected areas BID prn pain.  Dispense: 30 g; Refill: 0  -     Ambulatory referral/consult to Urology    Balanitis  -     clotrimazole-betamethasone 1-0.05% (LOTRISONE) cream; Apply thin layer topically to affected areas BID prn rash  Dispense: 45 g; Refill: 0                    1) See orders for this visit as documented in the electronic medical record.  2) Symptomatic therapy suggested: use acetaminophen/ibuprofen every 6-8 hours prn pain or fever, push fluids.   3) Call or return to clinic prn if these symptoms worsen or fail to improve as anticipated.    Discussed results/diagnosis/plan with patient in clinic.  We had shared decision making for patient's treatment. Patient verbalized understanding and in agreement with  "current treatment plan.     Patient was instructed to return for re-evaluation with urgent care or PCP for continued outpatient workup and management if symptoms do not improve/worsening symptoms. Strict ED versus clinic precautions given in depth.    Discharge and follow-up instructions given verbally/printed with the patient who expressed understanding. The instructions and results are also available on [employer].              Amanda "Annmarie" KATELYN Cabrera          Patient Instructions   If not allergic, take Tylenol (Acetaminophen) 650 mg to  1 g every 6 hours as needed for fever/pain and/or Motrin (Ibuprofen) 600 to 800 mg every 6 hours as needed for pain and/or fever        Please remember that you have received care at an urgent care today. Urgent cares are not emergency rooms and are not equipped to handle life threatening emergencies and cannot rule in or out certain medical conditions and you may be released before all of your medical problems are known or treated. Please arrange follow up with your primary care physician or speciality clinic  within 2-5 days if your signs and symptoms have not resolved or worsen. Patient can call our Referral Hotline at [phone] to make an appointment.    Please return here or go to the Emergency Department for any concerns or worsening of condition.Patient was educated on signs/symptoms that would warrant emergent medical attention. Patient verbalized understanding.  Signs of infection. These include a fever of 100.4°F (38°C) or higher, chills, pain with passing urine, mouth sores, a wound that will not heal, anal itching or pain.  Signs of wound infection. These include swelling, redness, warmth around the wound; too much pain when touched; yellowish, greenish, or bloody discharge; foul smell coming from the wound.            "

## 2025-04-25 NOTE — PATIENT INSTRUCTIONS
If not allergic, take Tylenol (Acetaminophen) 650 mg to  1 g every 6 hours as needed for fever/pain and/or Motrin (Ibuprofen) 600 to 800 mg every 6 hours as needed for pain and/or fever        Please remember that you have received care at an urgent care today. Urgent cares are not emergency rooms and are not equipped to handle life threatening emergencies and cannot rule in or out certain medical conditions and you may be released before all of your medical problems are known or treated. Please arrange follow up with your primary care physician or speciality clinic  within 2-5 days if your signs and symptoms have not resolved or worsen. Patient can call our Referral Hotline at [phone] to make an appointment.    Please return here or go to the Emergency Department for any concerns or worsening of condition.Patient was educated on signs/symptoms that would warrant emergent medical attention. Patient verbalized understanding.  Signs of infection. These include a fever of 100.4°F (38°C) or higher, chills, pain with passing urine, mouth sores, a wound that will not heal, anal itching or pain.  Signs of wound infection. These include swelling, redness, warmth around the wound; too much pain when touched; yellowish, greenish, or bloody discharge; foul smell coming from the wound.

## 2025-04-27 RX ORDER — CLOTRIMAZOLE AND BETAMETHASONE DIPROPIONATE 10; .64 MG/G; MG/G
CREAM TOPICAL
Qty: 45 G | Refills: 0 | Status: SHIPPED | OUTPATIENT
Start: 2025-04-27

## 2025-04-29 ENCOUNTER — OFFICE VISIT (OUTPATIENT)
Dept: UROLOGY | Facility: CLINIC | Age: 55
End: 2025-04-29
Payer: COMMERCIAL

## 2025-04-29 VITALS — BODY MASS INDEX: 21.62 KG/M2 | HEIGHT: 68 IN | WEIGHT: 142.63 LBS

## 2025-04-29 DIAGNOSIS — L81.9 DISCOLORATION OF SKIN: ICD-10-CM

## 2025-04-29 DIAGNOSIS — R23.8 SKIN IRRITATION: Primary | ICD-10-CM

## 2025-04-29 LAB
BILIRUBIN, UA POC OHS: NEGATIVE
BLOOD, UA POC OHS: NEGATIVE
CLARITY, UA POC OHS: CLEAR
COLOR, UA POC OHS: YELLOW
GLUCOSE, UA POC OHS: NEGATIVE
HSV1 DNA SPEC QL NAA+PROBE: NEGATIVE
HSV2 DNA SPEC QL NAA+PROBE: NEGATIVE
KETONES, UA POC OHS: NEGATIVE
LEUKOCYTES, UA POC OHS: NEGATIVE
NITRITE, UA POC OHS: NEGATIVE
PH, UA POC OHS: 6
PROTEIN, UA POC OHS: NEGATIVE
SPECIFIC GRAVITY, UA POC OHS: 1.01
SPECIMEN SOURCE: NORMAL
UROBILINOGEN, UA POC OHS: 0.2

## 2025-04-29 PROCEDURE — 99213 OFFICE O/P EST LOW 20 MIN: CPT | Mod: S$GLB,,,

## 2025-04-29 PROCEDURE — 1160F RVW MEDS BY RX/DR IN RCRD: CPT | Mod: CPTII,S$GLB,,

## 2025-04-29 PROCEDURE — 81003 URINALYSIS AUTO W/O SCOPE: CPT | Mod: QW,S$GLB,,

## 2025-04-29 PROCEDURE — 99999 PR PBB SHADOW E&M-EST. PATIENT-LVL III: CPT | Mod: PBBFAC,,,

## 2025-04-29 PROCEDURE — 1159F MED LIST DOCD IN RCRD: CPT | Mod: CPTII,S$GLB,,

## 2025-04-29 PROCEDURE — 3008F BODY MASS INDEX DOCD: CPT | Mod: CPTII,S$GLB,,

## 2025-04-29 RX ORDER — SULFAMETHOXAZOLE AND TRIMETHOPRIM 800; 160 MG/1; MG/1
1 TABLET ORAL 2 TIMES DAILY
Qty: 20 TABLET | Refills: 0 | Status: SHIPPED | OUTPATIENT
Start: 2025-04-29 | End: 2025-05-09
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
## 2025-04-30 ENCOUNTER — RESULTS FOLLOW-UP (OUTPATIENT)
Dept: URGENT CARE | Facility: CLINIC | Age: 55
End: 2025-04-30